# Patient Record
(demographics unavailable — no encounter records)

---

## 2024-10-22 NOTE — PHYSICAL EXAM
[Alert] : alert [No Acute Distress] : no acute distress [Normal Sclera/Conjunctiva] : normal sclera/conjunctiva [EOMI] : extra ocular movement intact [PERRL] : pupils equal, round and reactive to light [No Proptosis] : no proptosis [No Lid Lag] : no lid lag [No Neck Mass] : no neck mass was observed [No LAD] : no lymphadenopathy [Supple] : the neck was supple [Thyroid Not Enlarged] : the thyroid was not enlarged [Not Tender] : non-tender [Not Distended] : not distended [Soft] : abdomen soft [No HSM] : no hepato-splenomegaly [Normal Supraclavicular Nodes] : no supraclavicular lymphadenopathy [No Clubbing, Cyanosis] : no clubbing  or cyanosis of the fingernails [No Rash] : no rash [No Skin Lesions] : no skin lesions [Abdominal Striae] : abdominal striae present [Acanthosis Nigricans] : acanthosis nigricans present [No Motor Deficits] : the motor exam was normal [No Sensory Deficits] : the sensory exam was normal to light touch and pinprick [Normal Reflexes] : deep tendon reflexes were 2+ and symmetric [No Tremors] : no tremors [Oriented x3] : oriented to person, place, and time

## 2024-10-22 NOTE — ASSESSMENT
[FreeTextEntry1] : Type 2 diabetes: HBA1C is still above goal but better to 7.7% - confirmed with elevated fructosamine Advised to restart low dose of ozempic 1 mg weekly Discussed to take Metformin 500 (1 tab) in AM and 1tab in PM.  - Advised patient to measure sugars before meals and bedtime and have a log book of the numbers. Patient to bring log book prior to next Endocrine appointment - Continue with simvastatin 5 mg daily - Microalbuminuria is improving - Uptodate with ophthalmologist - Medication compliance re-emphasized. - Counselled to avoid high carbohydrate diet,    Obesity: Patient has lost 5lbs weight, is currently happy with the weight loss, a Restart Ozempic 1 mg weekly Continue metformin 1000 mg daily . Lab work before the next visit  Hyperlipidemia with hypertriglyceridemia: Continue Simvastatin 5 mg daily with fenofibrate 45 mg daily The lipid profile is wnl. LDL is 48  Patient is having complaints of Diplopia, suggested to follow up with PCP

## 2024-10-22 NOTE — REVIEW OF SYSTEMS
[Fatigue] : fatigue [Decreased Appetite] : decreased appetite [Recent Weight Gain (___ Lbs)] : recent weight gain: [unfilled] lbs [Blurred Vision] : blurred vision [Recent Weight Loss (___ Lbs)] : no recent weight loss [Neck Pain] : no neck pain [Chest Pain] : no chest pain [Palpitations] : no palpitations [Lower Ext Edema] : no lower extremity edema [Nausea] : no nausea [Constipation] : no constipation [Abdominal Pain] : no abdominal pain [Vomiting] : no vomiting [Diarrhea] : no diarrhea [Polyuria] : no polyuria [Joint Pain] : no joint pain [Headaches] : no headaches [Dizziness] : no dizziness [Tremors] : no tremors [Pain/Numbness of Digits] : no pain/numbness of digits [Cold Intolerance] : no cold intolerance [FreeTextEntry3] : Diplopia

## 2024-10-22 NOTE — HISTORY OF PRESENT ILLNESS
[FreeTextEntry1] : 67 year old Female with PMH of Type 2 DM, HTN, HLD, Hypertriglyceridemia, spinal stenosis, Kidney stones, s/p lithotripsy s/p stent, trigger came for type 2 diabetes follow up.  Diabetes Hx: Diabetes diagnosed for 10 years Medication regimen: Stopped Glimepiride 4 mg many months ago. Currently taking metformin  mg BID, stopped Taking Ozempic 2 mg weekly 3 weeks ago as patient is having consistent diplopia and dizziness since Sept 2024. Reports she has seen multiple opthalmologists however was told that her retinal exam. She is taking losartan 25 mg daily, Simvastatin 5 mg daily, Fenofibrate 54 mg Recent fingersticks: Sometimes checking the  Hypoglycemic events: Denies Diet: Has never seen nutritionist BF: Bread and butter and boiled egg, roll with cheese, oatmeal, turkey, Lunch: Sometimes skip lunch, strawberries, pears, 1% milk, smoothies, Dinner: Varies, basmati rice, mesh potatoes, chicken, sometimes pork chop, turkey, salads, Beverage: water, no sodas, stopped drinking juice Exercise: Stretching Opthalmology appointment: tomorrow she has an appointment with her opthalmologist. Recently seen a retina specialist, denies retinopathy Peripheral Neuropathy: Denies, sees podiartist yearly CVD: Denies Infections: Denies Vaccinations: Received Covid vaccination, flu vaccine,

## 2024-11-11 NOTE — DATA REVIEWED
[de-identified] : EXAM: [id number] - MR BRAIN  - ORDERED BY: JUAN R EASLEY   PROCEDURE DATE:  10/29/2024    INTERPRETATION:  CLINICAL INFORMATION: mengioma;. OQM. Ordering Dxs: D32.9 Benign neoplasm of meninges, unspecified.  TECHNIQUE: Multiplanar, multisequence brain MRI was performed.  CONTRAST: NONE: , .  COMPARISON: MRI brain 5/14/2024. CT head 4/16/2024.  FINDINGS:  Stable 8 mm calcified extra-axial lesion along the right frontal convexity suggesting a meningioma (9:21).  There is no evidence of acute infarct. There are no foci of susceptibility artifact to suggest hemorrhage. Scattered foci of T2/FLAIR hyperintensity in the bilateral hemispheric white matter are nonspecific but likely related to chronic white matter microvascular ischemic disease. The ventricles are normal in size for patient's age.  Flow voids of the major intracranial vessels at the skull base follow expected course and contour.  The paranasal sinuses demonstrate no signal abnormality. The mastoids demonstrate no signal abnormality.  Bilateral orbits are within normal limits.   IMPRESSION: Stable 8 mm calcified extra-axial lesion along the right frontal convexity suggesting a meningioma (9:21).  --- End of Report ---

## 2024-11-11 NOTE — HISTORY OF PRESENT ILLNESS
[FreeTextEntry1] :     Faustino patient is here for pain on right side of head described as burning pain, occurring about once per month, lasting for few minutes. Not followed with a headache. Patient stopped wearing her hair up.  She had imaging which showed incidental meningioma. Has chronic neck pain, non radicular, feels like muscle tension.  For the past couple months since the loss of her father, the patient has been experience significant pulmonary depression and double vision.  She denies any head trauma or any strokelike symptoms other than the double vision at that time.  She was seen by several ophthalmologist without any notable findings as per the patient.  The patient also had an MRI of the brain which was unremarkable for CVA.

## 2024-11-11 NOTE — ASSESSMENT
[FreeTextEntry1] : Vision, double vision, the patient has double vision on neurological examination, vertigo, for cases without any notable limitation of extraocular muscle movement.  Will refer patient to her neuro-ophthalmologist for evaluation.  Additionally, will obtain myasthenia gravis markers as well.  Neuralgia right head, occurring once per month without headache will continue to monitor  Incidental meningioma with MRI brain showing: Redemonstration of a calcified extra-axial lesion along the right frontal convexity suggesting a meningioma (9:21). No significant mass effect. No vasogenic edema in the adjacent brain parenchyma.  referred neurosurgery  Musculoskeletal neck pain will refer to PT  I spent the time noted on the day of this patient encounter preparing for, providing and documenting the above E/M service and counseling and educate patient on differential, workup, disease course, and treatment/management. Education was provided to the patient during this encounter. All questions and concerns were answered and addressed in detail. The patient verbalized understanding and agreed to plan. Patient was advised to continue to monitor for neurologic symptoms and to notify my office or go to the nearest emergency room if there are any changes. Any orders/referrals and communications were provided as well.  Side effects of the above medications were discussed in detail including but not limited to applicable black box warning and teratogenicity as appropriate. For patients with seizures, I discussed risk of SUDEP with patient and advised that SUDEP risk can be minimized with good seizure control through medication compliance and other measures. Patient was advised to bring previous records to my office, including CD of imaging, when applicable.

## 2024-11-19 NOTE — ASSESSMENT
[FreeTextEntry1] : test results dw pt, advised patient to fu rheum for +greg eloise. referral in chart   I spent the time noted on the day of this patient encounter preparing for, providing and documenting the above E/M service and counseling and educate patient on differential, workup, disease course, and treatment/management. Education was provided to the patient during this encounter. All questions and concerns were answered and addressed in detail. The patient verbalized understanding and agreed to plan. Patient was advised to continue to monitor for neurologic symptoms and to notify my office or go to the nearest emergency room if there are any changes. Any orders/referrals and communications were provided as well.   Side effects of the above medications were discussed in detail including but not limited to applicable black box warning and teratogenicity as appropriate.  For patients with seizures, I discussed risk of SUDEP with patient and advised that SUDEP risk can be minimized with good seizure control through medication compliance and other measures. Patient was advised to bring previous records to my office, including CD of imaging, when applicable.

## 2024-11-19 NOTE — REASON FOR VISIT
[Home] : at home, [unfilled] , at the time of the visit. [Medical Office: ([facility])___] : at the medical office located in  [Verbal consent obtained from patient] : the patient, [unfilled] [Follow-Up: _____] : a [unfilled] follow-up visit

## 2025-02-11 NOTE — ASSESSMENT
[FreeTextEntry1] : Type 2 diabetes: - HBA1C is still above goal 8.5% and patient is not losing weight on ozempic - switch ozempic to Mounjaro 5 mg weekly - Continue Metformin 500 (1 tab) in AM and 1tab in PM. - Advised patient to measure sugars before meals and bedtime and have a log book of the numbers. Patient to bring log book prior to next Endocrine appointment - Continue with simvastatin 5 mg daily - Microalbuminuria is improving - Uptodate with ophthalmologist - Medication compliance re-emphasized. - Counselled to avoid high carbohydrate diet,   Obesity: Patient is not losing weight on ozempic switch ozempic to mounjaro 5 mg weekly Continue metformin 1000 mg daily   Hyperlipidemia with hypertriglyceridemia: Continue Simvastatin 5 mg daily with fenofibrate 45 mg daily The lipid profile is wnl. LDL is 56  . Lab work before the next visit

## 2025-02-11 NOTE — REVIEW OF SYSTEMS
[Fatigue] : fatigue [Decreased Appetite] : appetite not decreased [Recent Weight Gain (___ Lbs)] : no recent weight gain [Recent Weight Loss (___ Lbs)] : no recent weight loss [Blurred Vision] : no blurred vision [Shortness Of Breath] : no shortness of breath [Cough] : no cough [Nausea] : no nausea [Constipation] : no constipation [Vomiting] : no vomiting [Diarrhea] : no diarrhea [Polyuria] : no polyuria [Headaches] : no headaches [Dizziness] : no dizziness [Tremors] : no tremors [Pain/Numbness of Digits] : no pain/numbness of digits [Polydipsia] : no polydipsia

## 2025-02-11 NOTE — HISTORY OF PRESENT ILLNESS
[FreeTextEntry1] : 67 year old Female with PMH of Type 2 DM, HTN, HLD, Hypertriglyceridemia, spinal stenosis, Kidney stones, s/p lithotripsy s/p stent, trigger came for type 2 diabetes follow up.  Diabetes Hx: Diabetes diagnosed for 10 years Medication regimen: Stopped Glimepiride 4 mg many months ago. Currently taking metformin  mg BID, Ozempic 1 mg weekly. She is taking losartan 25 mg daily, Simvastatin 5 mg daily, Fenofibrate 54 mg Recent fingersticks: Has stopped checking BS daily, sometimes does in the morning BS are in 200s Hypoglycemic events: Denies Diet: Has started eating high carbohydrate diet  BF: Bread and butter and boiled egg, roll with cheese, oatmeal, turkey, Lunch: Sometimes skip lunch, strawberries, pears, 1% milk, smoothies, Dinner: Varies, basmati rice, mesh potatoes, chicken, sometimes pork chop, turkey, salads, Beverage: water, no sodas, stopped drinking juice Exercise: Stretching Opthalmology appointment. Has seen retina specialist when she was having diplopia, which has improved. Denies retinopathy Peripheral Neuropathy: Denies, sees podiartist yearly CVD: Denies Infections: Denies Vaccinations: Received Covid vaccination, flu vaccine,

## 2025-03-11 NOTE — REVIEW OF SYSTEMS
[Nasal Discharge] : nasal discharge [Sore Throat] : sore throat [Shortness Of Breath] : shortness of breath [Wheezing] : wheezing [Cough] : cough [Negative] : Heme/Lymph [Earache] : no earache [Hearing Loss] : no hearing loss [Nosebleed] : no nosebleeds [Hoarseness] : no hoarseness [Postnasal Drip] : no postnasal drip [Dyspnea on Exertion] : no dyspnea on exertion

## 2025-03-11 NOTE — PHYSICAL EXAM
[No Acute Distress] : no acute distress [Well Nourished] : well nourished [Well Developed] : well developed [Well-Appearing] : well-appearing [Normal Sclera/Conjunctiva] : normal sclera/conjunctiva [PERRL] : pupils equal round and reactive to light [EOMI] : extraocular movements intact [Normal Outer Ear/Nose] : the outer ears and nose were normal in appearance [Normal Oropharynx] : the oropharynx was normal [No JVD] : no jugular venous distention [No Lymphadenopathy] : no lymphadenopathy [Supple] : supple [Thyroid Normal, No Nodules] : the thyroid was normal and there were no nodules present [No Respiratory Distress] : no respiratory distress  [No Accessory Muscle Use] : no accessory muscle use [Clear to Auscultation] : lungs were clear to auscultation bilaterally [Normal Rate] : normal rate  [Regular Rhythm] : with a regular rhythm [Normal S1, S2] : normal S1 and S2 [No Murmur] : no murmur heard [No Carotid Bruits] : no carotid bruits [No Abdominal Bruit] : a ~M bruit was not heard ~T in the abdomen [No Varicosities] : no varicosities [Pedal Pulses Present] : the pedal pulses are present [No Edema] : there was no peripheral edema [No Palpable Aorta] : no palpable aorta [No Extremity Clubbing/Cyanosis] : no extremity clubbing/cyanosis [Soft] : abdomen soft [Non Tender] : non-tender [Non-distended] : non-distended [No Masses] : no abdominal mass palpated [No HSM] : no HSM [Normal Bowel Sounds] : normal bowel sounds [Normal Posterior Cervical Nodes] : no posterior cervical lymphadenopathy [Normal Anterior Cervical Nodes] : no anterior cervical lymphadenopathy [No CVA Tenderness] : no CVA  tenderness [No Spinal Tenderness] : no spinal tenderness [No Joint Swelling] : no joint swelling [Grossly Normal Strength/Tone] : grossly normal strength/tone [No Rash] : no rash [Coordination Grossly Intact] : coordination grossly intact [No Focal Deficits] : no focal deficits [Normal Gait] : normal gait [Deep Tendon Reflexes (DTR)] : deep tendon reflexes were 2+ and symmetric [Normal Affect] : the affect was normal [Normal Insight/Judgement] : insight and judgment were intact [de-identified] : (+) bilateral crackles and decreased breath sounds

## 2025-03-11 NOTE — HISTORY OF PRESENT ILLNESS
[Cold Symptoms] : cold symptoms [___ Weeks ago] :  [unfilled] weeks ago [Congestion] : congestion [Cough] : cough [Sore Throat] : sore throat [Wheezing] : no wheezing [Chills] : no chills [Anorexia] : no anorexia [Shortness Of Breath] : no shortness of breath [Earache] : no earache [Fatigue] : not fatigue [Headache] : no headache [Fever] : no fever [FreeTextEntry8] : Patient is a 69 y/o F w/ PMHx of DM, HLD, renal calculi who came here due to episodes of cough, nasal congestion and shortness of breath. She denies any headache, dizziness, nausea, vomiting, fever, chest pain, palpitation, heartburn, abdominal pain, diarrhea, constipation, dysuria, hematuria, back pain, joint pain, weight loss, loss of appetite. She went to [facility] and tested positive for RSV.

## 2025-03-11 NOTE — ASSESSMENT
[FreeTextEntry1] : Patient is a 69 y/o F w/ PMHx of DM, HLD, renal calculi who came here due to episodes of cough, nasal congestion and shortness of breath.   #URI - continue fluticasone nasal spray as needed for nasal congestion - take benzonatate tabs as needed for cough - observe standard precaution (hand washing, face mask and cough etiquette) - RVP - +RSV at [facility] - start Azithromycin x 5 days   #DM - a1c - 8.5 - A/C ratio - 68 - monitor glucose ACHS - check a1c, CMP, UA w/ micros, A/C ratio - refer to Ophtha for DM retinopathy screening - refer to Podiatry for DM foot screening - refer to Endo/ Nutritionist - continue taking meds   #HLD - lipid panel - wnl - low fat diet - continue taking meds   #Obesity - BMI - 30 - advised proper diet and exercise - advised weight loss  Total time spent caring for the patient today was 20 minutes. This includes time spent before the visit reviewing the chart, time spent during visit, and time spent after the visit on documentation, etc.

## 2025-03-31 NOTE — ASSESSMENT
[FreeTextEntry1] : 1. positive JEAN CLAUDE with vision changes that have resolves no complains today add full rheum work-up  patient to call in 1 week to discuss results and next steps  f/u as needed or if symptoms return

## 2025-03-31 NOTE — HISTORY OF PRESENT ILLNESS
[None] : The patient is currently asymptomatic [FreeTextEntry1] : seen by neurologist for eye issues that started about 1 year ago - with blurred vision and parallel lines and double vision that resolved after 3 months - unknown origin of her symptoms - abs done with positive JEAN CLAUDE feels okay today with no complains  PMH of Dm2, HTN and Hyperlipidemia  Denies any fevers, chill, rashes, weight loss, fatigue,  hair loss, joint pains, dry eyes or mouth, mouth sores, Raynaud's. chest pain or SOB, GI or , numbness/tingling

## 2025-05-22 NOTE — HISTORY OF PRESENT ILLNESS
[FreeTextEntry1] : 67 year old Female with PMH of Type 2 DM, HTN, HLD, Hypertriglyceridemia, spinal stenosis, Kidney stones, s/p lithotripsy s/p stent, trigger came for type 2 diabetes follow up.  Diabetes Hx: Diabetes diagnosed for 10 years Medication regimen: Stopped Glimepiride 4 mg many months ago. Currently taking metformin  mg BID, Ozempic 2 mg weekly. She is taking losartan 25 mg daily, Simvastatin 5 mg daily, Fenofibrate 54 mg Recent fingersticks: Has stopped checking BS daily, sometimes does in the morning BS are in 200s Hypoglycemic events: Denies Diet: Has started eating low carbohydrate diet BF: Bread and butter and boiled egg, roll with cheese, oatmeal, turkey, Lunch: Sometimes skip lunch, strawberries, pears, 1% milk, smoothies, Dinner: Varies, basmati rice, mesh potatoes, chicken, sometimes pork chop, turkey, salads, Beverage: water, no sodas, stopped drinking juice Exercise: Stretching Opthalmology appointment. Has seen retina specialist when she was having diplopia, which has improved. Denies retinopathy Peripheral Neuropathy: Denies, sees podiartist yearly CVD: Denies Infections: Denies Vaccinations: Received Covid vaccination, flu vaccine,

## 2025-05-22 NOTE — ASSESSMENT
[FreeTextEntry1] :  HBA1C is still above goal 8.1% and patient is now actively losing weight on ozempic - Ozempic 2 mg weekly - Continue Metformin 500 (1 tab) in AM and 1tab in PM. advised to take metformin to 2 tabs in PM ( total dose 1500 mg) - Advised patient to measure sugars before meals and bedtime and have a log book of the numbers. Patient to bring log book prior to next Endocrine appointment - Continue with simvastatin 5 mg daily. Patient wants to stop fenofibrate as triglycerides has improved. Will give a trial to stop fenofibrate - Microalbuminuria is improving however still present, continue losartan 50 mg daily - Uptodate with ophthalmologist - Medication compliance re-emphasized. - Counselled to avoid high carbohydrate diet,  Obesity: continue ozempic 2 mg weekly Continue metformin 1000 mg daily  Hyperlipidemia with hypertriglyceridemia: Continue Simvastatin 5 mg daily  stop fenofibrate The lipid profile is wnl. LDL is 56  . Lab work before the next visit.

## 2025-07-07 NOTE — HISTORY OF PRESENT ILLNESS
[FreeTextEntry1] : throat pain that occurred suddenly, lasted a few days, but wasn't constant. [de-identified] : 67 year old Female with PMH of Type 2 DM, HTN, HLD, Hypertriglyceridemia, spinal stenosis, benign menigioma,Kidney stones, s/p lithotripsy s/p stent, trigger came to the office due to having severe throat pain over the weekend that radiated down to her mid sternum. She said the pain was very severe and she was unable to eat but denied having any difficulty breathing. She felt bloated and had never experienced this before. She described it as feeling very different then having heartburn. She endorsed burping a lot while this was occuring. She tried some tea and tums that helped a little bit. She reports the symptoms and feelings as having subsided since yesterday but was concerned about it prompting this visit. Patient will schedule appointment for next month for physical.

## 2025-07-07 NOTE — END OF VISIT
[] : Resident [FreeTextEntry3] : I, Dr. Ricardo Soto, saw and evaluated this patient in the presence of the resident. I discussed the management with the resident. I reviewed the note and agreed with the documented plan of care with the following confirmation/ corrections/ additions: None.  [Time Spent: ___ minutes] : I have spent [unfilled] minutes of time on the encounter which excludes teaching and separately reported services.

## 2025-07-07 NOTE — HISTORY OF PRESENT ILLNESS
[FreeTextEntry1] : throat pain that occurred suddenly, lasted a few days, but wasn't constant. [de-identified] : 67 year old Female with PMH of Type 2 DM, HTN, HLD, Hypertriglyceridemia, spinal stenosis, benign menigioma,Kidney stones, s/p lithotripsy s/p stent, trigger came to the office due to having severe throat pain over the weekend that radiated down to her mid sternum. She said the pain was very severe and she was unable to eat but denied having any difficulty breathing. She felt bloated and had never experienced this before. She described it as feeling very different then having heartburn. She endorsed burping a lot while this was occuring. She tried some tea and tums that helped a little bit. She reports the symptoms and feelings as having subsided since yesterday but was concerned about it prompting this visit. Patient will schedule appointment for next month for physical.

## 2025-07-07 NOTE — REVIEW OF SYSTEMS
[Fever] : no fever [Night Sweats] : no night sweats [Discharge] : no discharge [Vision Problems] : no vision problems [Palpitations] : no palpitations [Shortness Of Breath] : no shortness of breath [Abdominal Pain] : no abdominal pain [Nausea] : no nausea [Vomiting] : no vomiting [Dysuria] : no dysuria [Hematuria] : no hematuria [Negative] : Heme/Lymph

## 2025-07-07 NOTE — PHYSICAL EXAM
[No Acute Distress] : no acute distress [Well Nourished] : well nourished [Well Developed] : well developed [Normal] : no acute distress, well nourished, well developed and well-appearing [No Respiratory Distress] : no respiratory distress  [No Accessory Muscle Use] : no accessory muscle use [Clear to Auscultation] : lungs were clear to auscultation bilaterally [Normal Rate] : normal rate  [Regular Rhythm] : with a regular rhythm [Normal S1, S2] : normal S1 and S2 [Soft] : abdomen soft [Non Tender] : non-tender [No HSM] : no HSM

## 2025-07-07 NOTE — PLAN
[FreeTextEntry1] : 67 year old Female with PMH of Type 2 DM, HTN, HLD, Hypertriglyceridemia, spinal stenosis, benign menigioma,Kidney stones, s/p lithotripsy s/p stent, trigger came to the office due to having severe throat pain   #Diabetes -c/w metformin 500 1x daily -continue to follow with endocrinology.  #throat pain -continue to monitor symptoms and possible swallow study if symptoms return and/or persist.  HCM -Patient reported that she is no longer taking fenofibrate since the endocrinologist stopped it. - Return in 1 month for CPE or sooner if needed.  Total time spent caring for the patient today was 30 minutes. This includes time spent before the visit reviewing the chart, time spent during visit, and time spent after the visit on documentation, etc.

## 2025-07-30 NOTE — DATA REVIEWED
[MRI] : MRI [Lumbar Spine] : lumbar spine [Report was reviewed and noted in the chart] : The report was reviewed and noted in the chart [I independently reviewed and interpreted images and report] : I independently reviewed and interpreted images and report [FreeTextEntry1] : 12/21/2022 MRI Lumbar Spine ([facility]) FINDINGS: For purposes of this dictation, the last well-formed disc space will be labeled L5-S1.  OSSEOUS STRUCTURES: Vertebral body heights are preserved. No marrow edema or destructive marrow infiltrative process.   ALIGNMENT: Stable grade 1 anterolisthesis of 7 mm of L4. No scoliosis.  SPINAL CORD AND CONUS MEDULLARIS: Normal signal.  PARASPINAL AND INTRA-ABDOMINAL SOFT TISSUES: Unremarkable.  INCLUDED THORACIC SPINE AND SACRUM: Unremarkable, except for stable mild degenerative discogenic disease and small disc protrusions at T12-L1 without spinal canal stenosis or cord compression.  DISCS: Stable mild desiccation and narrowing of the disc at L4-L5.  Facets: Stable L4-5 severe facet arthrosis, mild at other L2-S1 levels.  Redemonstration of mild lumbar epidural lipomatosis.  The following axial levels are imaged and detailed below:  L1-L2: No disc bulging or herniation. No spinal canal or foraminal stenosis.  L2-L3: No disc bulging or herniation. No spinal canal or foraminal stenosis.  L3-L4: No disc bulging or herniation. No spinal canal or foraminal stenosis.  L4-L5: Stable small uncovered right asymmetric disc bulge, severe facet arthrosis, ligamenta flava hypertrophy, small 3 mm left synovial cyst, and increased 5 mm right synovial cyst, subarticular recess stenosis and impingement of transversing right L5 nerve root. Otherwise stable moderate spinal canal stenosis, compression of the thecal sac and crowding of the nerve roots of the cauda equina. Stable mild foraminal stenoses without compression of the exiting nerve roots.  L5-S1: Stable miniscule symmetric disc bulge, mild facet arthrosis containing a minimal amount of fluid and epidural lipomatosis with mild circumferential narrowing of the thecal sac and foraminal without compression of the exiting nerves. No spinal canal stenosis.  IMPRESSION:   L4-5 stable severe facet arthrosis, ligamenta flava hypertrophy, grade 1 anterolisthesis of 7 mm, and mild increase of 5 mm right synovial cyst, subarticular recess stenosis and impingement of transversing right L5 nerve root. Otherwise stable moderate spinal canal stenosis, compression of the thecal sac and crowding of the nerve roots of the cauda equina.  Stable mild spondylosis at the other lumbar levels without significant spinal canal or foraminal stenosis. See above for detailed description of degenerative changes per levels of the lumbar spine.

## 2025-07-30 NOTE — HISTORY OF PRESENT ILLNESS
[FreeTextEntry1] : 7/31/2025: RUDY MERAZ is a 68 year-old woman presenting for a NPV ([place]) for a history of chronic low back pain.    The patient states that average pain over the past week was /10 in severity. Mood: Sleep:   Prior treatment: 6/27/2023: RIGHT SI joint injection 5/23/2023: BILATERA L3, L4, L5 MB RFA w/ 70% improvement of pain and function

## 2025-07-30 NOTE — DISCUSSION/SUMMARY
[de-identified] : RUDY MERAZ is a 68 year-old woman presenting for a NPV for a history of chronic low back pain.

## 2025-07-30 NOTE — DISCUSSION/SUMMARY
[de-identified] : RUDY MERAZ is a 68 year-old woman presenting for a NPV for a history of chronic low back pain.